# Patient Record
Sex: MALE | Race: BLACK OR AFRICAN AMERICAN
[De-identification: names, ages, dates, MRNs, and addresses within clinical notes are randomized per-mention and may not be internally consistent; named-entity substitution may affect disease eponyms.]

---

## 2018-02-11 ENCOUNTER — HOSPITAL ENCOUNTER (EMERGENCY)
Dept: HOSPITAL 17 - NEPE | Age: 48
Discharge: HOME | End: 2018-02-11
Payer: SELF-PAY

## 2018-02-11 VITALS
DIASTOLIC BLOOD PRESSURE: 91 MMHG | SYSTOLIC BLOOD PRESSURE: 145 MMHG | RESPIRATION RATE: 16 BRPM | HEART RATE: 69 BPM | OXYGEN SATURATION: 98 % | TEMPERATURE: 98.7 F

## 2018-02-11 VITALS — HEIGHT: 67 IN | BODY MASS INDEX: 23.53 KG/M2 | WEIGHT: 149.91 LBS

## 2018-02-11 DIAGNOSIS — G43.909: Primary | ICD-10-CM

## 2018-02-11 LAB
BASOPHILS # BLD AUTO: 0 TH/MM3 (ref 0–0.2)
BASOPHILS NFR BLD: 0.9 % (ref 0–2)
BUN SERPL-MCNC: 11 MG/DL (ref 7–18)
CALCIUM SERPL-MCNC: 8.1 MG/DL (ref 8.5–10.1)
CHLORIDE SERPL-SCNC: 108 MEQ/L (ref 98–107)
CREAT SERPL-MCNC: 1.13 MG/DL (ref 0.6–1.3)
EOSINOPHIL # BLD: 0.2 TH/MM3 (ref 0–0.4)
EOSINOPHIL NFR BLD: 5.1 % (ref 0–4)
ERYTHROCYTE [DISTWIDTH] IN BLOOD BY AUTOMATED COUNT: 13.1 % (ref 11.6–17.2)
GFR SERPLBLD BASED ON 1.73 SQ M-ARVRAT: 84 ML/MIN (ref 89–?)
GLUCOSE SERPL-MCNC: 53 MG/DL (ref 74–106)
HCO3 BLD-SCNC: 30.7 MEQ/L (ref 21–32)
HCT VFR BLD CALC: 38.2 % (ref 39–51)
HGB BLD-MCNC: 13.2 GM/DL (ref 13–17)
LYMPHOCYTES # BLD AUTO: 1.2 TH/MM3 (ref 1–4.8)
LYMPHOCYTES NFR BLD AUTO: 34.2 % (ref 9–44)
MCH RBC QN AUTO: 32.8 PG (ref 27–34)
MCHC RBC AUTO-ENTMCNC: 34.4 % (ref 32–36)
MCV RBC AUTO: 95.4 FL (ref 80–100)
MONOCYTE #: 0.3 TH/MM3 (ref 0–0.9)
MONOCYTES NFR BLD: 9.9 % (ref 0–8)
NEUTROPHILS # BLD AUTO: 1.7 TH/MM3 (ref 1.8–7.7)
NEUTROPHILS NFR BLD AUTO: 49.9 % (ref 16–70)
PLATELET # BLD: 167 TH/MM3 (ref 150–450)
PMV BLD AUTO: 8.6 FL (ref 7–11)
RBC # BLD AUTO: 4.01 MIL/MM3 (ref 4.5–5.9)
SODIUM SERPL-SCNC: 143 MEQ/L (ref 136–145)
WBC # BLD AUTO: 3.5 TH/MM3 (ref 4–11)

## 2018-02-11 PROCEDURE — 96361 HYDRATE IV INFUSION ADD-ON: CPT

## 2018-02-11 PROCEDURE — 85025 COMPLETE CBC W/AUTO DIFF WBC: CPT

## 2018-02-11 PROCEDURE — 99284 EMERGENCY DEPT VISIT MOD MDM: CPT

## 2018-02-11 PROCEDURE — 80048 BASIC METABOLIC PNL TOTAL CA: CPT

## 2018-02-11 PROCEDURE — 96374 THER/PROPH/DIAG INJ IV PUSH: CPT

## 2018-02-11 NOTE — PD
HPI


Chief Complaint:  Headache


Time Seen by Provider:  06:08


Travel History


International Travel<30 days:  No


Contact w/Intl Traveler<30days:  No


Traveled to known affect area:  No





History of Present Illness


HPI


47-year-old male came to the emergency room with history of headache.  Patient 

has history of migraine.  Patient says that he hasn't had headache in past 6-7 

months.  The location and the nature of the headache is like his usual except 

this is worse in intensity.  Patient says this is been going on for past 2 

days.  He's been taking Excedrin Migraine without much relief.  No history of 

vomiting.  He has been nauseous.  He has some photophobia.  No history of fever 

or chills.  No history of neck pain.  Vital signs are stable otherwise.





[facility]


Past Medical History


*** Narrative Medical


List of his past medical, surgical, social and family history is reviewed from 

the nursing note.





Past Surgical History


Other Surgery:  Yes (hand surgery)





Social History


Alcohol Use:  Yes (occasional )


Tobacco Use:  No


Substance Use:  No





Allergies-Medications


(Allergen,Severity, Reaction):  


Coded Allergies:  


     No Known Allergies (Unverified , 7/27/16)


Comments


No known drug allergies.


Reported Meds & Prescriptions





Reported Meds & Active Scripts


Active


Keflex (Cephalexin Monohydrate) 500 Mg Cap 500 Mg PO Q8


Ultram (Tramadol HCl) 50 Mg Tab 1-2 Tab PO Q6H PRN


     FOR PAIN


Phenergan (Promethazine HCl) 25 Mg Tab 25 Mg PO Q6H PRN


     FOR NAUSEA/VOMITING





Narrative Medication


List of his home medications reviewed from the nursing note.





Review of Systems


Except as stated in HPI:  all other systems reviewed are Neg


Neurologic:  Positive: Headache





Physical Exam


Narrative


GENERAL: Awake, alert, moderate distress


SKIN: Focused skin assessment warm/dry.


HEAD: Atraumatic. Normocephalic. 


EYES: Pupils equal and round. No scleral icterus. No injection or drainage. 


ENT: No nasal bleeding or discharge.  Mucous membranes pink and moist.


NECK: Trachea midline. No JVD.  Neck is supple.  No signs of meningismus


CARDIOVASCULAR: Regular rate and rhythm.  No murmur appreciated.


RESPIRATORY: No accessory muscle use. Clear to auscultation. Breath sounds 

equal bilaterally. 


GASTROINTESTINAL: Abdomen soft, non-tender, nondistended. Hepatic and splenic 

margins not palpable. 


MUSCULOSKELETAL: No obvious deformities. No clubbing.  No cyanosis.  No edema. 


NEUROLOGICAL: Awake and alert. No obvious cranial nerve deficits.  Motor 

grossly within normal limits. Normal speech.


PSYCHIATRIC: Appropriate mood and affect; insight and judgment normal.





Data


Data


Last Documented VS





Vital Signs








  Date Time  Temp Pulse Resp B/P (MAP) Pulse Ox O2 Delivery O2 Flow Rate FiO2


 


2/11/18 05:53 98.7 69 16 145/91 (109) 98   








Orders





 Orders


Complete Blood Count With Diff (2/11/18 06:16)


Basic Metabolic Panel (Bmp) (2/11/18 06:16)


Ecg Monitoring (2/11/18 06:16)


Iv Access Insert/Monitor (2/11/18 06:16)


Oximetry (2/11/18 06:16)


Sodium Chloride 0.9% Flush (Ns Flush) (2/11/18 06:30)


Prochlorperazine Inj (Compazine Inj) (2/11/18 06:30)


Sodium Chlor 0.9% 1000 Ml Inj (Ns 1000 M (2/11/18 06:16)


Ed Discharge Order (2/11/18 08:20)





Labs





Laboratory Tests








Test


  2/11/18


06:35


 


White Blood Count 3.5 TH/MM3 


 


Red Blood Count 4.01 MIL/MM3 


 


Hemoglobin 13.2 GM/DL 


 


Hematocrit 38.2 % 


 


Mean Corpuscular Volume 95.4 FL 


 


Mean Corpuscular Hemoglobin 32.8 PG 


 


Mean Corpuscular Hemoglobin


Concent 34.4 % 


 


 


Red Cell Distribution Width 13.1 % 


 


Platelet Count 167 TH/MM3 


 


Mean Platelet Volume 8.6 FL 


 


Neutrophils (%) (Auto) 49.9 % 


 


Lymphocytes (%) (Auto) 34.2 % 


 


Monocytes (%) (Auto) 9.9 % 


 


Eosinophils (%) (Auto) 5.1 % 


 


Basophils (%) (Auto) 0.9 % 


 


Neutrophils # (Auto) 1.7 TH/MM3 


 


Lymphocytes # (Auto) 1.2 TH/MM3 


 


Monocytes # (Auto) 0.3 TH/MM3 


 


Eosinophils # (Auto) 0.2 TH/MM3 


 


Basophils # (Auto) 0.0 TH/MM3 


 


CBC Comment DIFF FINAL 


 


Differential Comment  


 


Blood Urea Nitrogen 11 MG/DL 


 


Creatinine 1.13 MG/DL 


 


Random Glucose 53 MG/DL 


 


Calcium Level 8.1 MG/DL 


 


Sodium Level 143 MEQ/L 


 


Potassium Level 3.7 MEQ/L 


 


Chloride Level 108 MEQ/L 


 


Carbon Dioxide Level 30.7 MEQ/L 


 


Anion Gap 4 MEQ/L 


 


Estimat Glomerular Filtration


Rate 84 ML/MIN 


 











MDM


Medical Decision Making


Medical Screen Exam Complete:  Yes


Emergency Medical Condition:  Yes


Medical Record Reviewed:  Yes


Differential Diagnosis


Migraine headache, headache NOS


Narrative Course


7:09 AM patient has been given IV fluid bolus and IV Compazine.  Awaiting for 

blood test results.  Patient has been signed over to the oncoming ER physician.





Procedures


EKG Prior to Arrival:  Jadon Cohen MD Feb 11, 2018 06:08

## 2018-02-11 NOTE — PD
Data


Data


Last Documented VS





Vital Signs








  Date Time  Temp Pulse Resp B/P (MAP) Pulse Ox O2 Delivery O2 Flow Rate FiO2


 


2/11/18 05:53 98.7 69 16 145/91 (109) 98   








Orders





 Orders


Complete Blood Count With Diff (2/11/18 06:16)


Basic Metabolic Panel (Bmp) (2/11/18 06:16)


Ecg Monitoring (2/11/18 06:16)


Iv Access Insert/Monitor (2/11/18 06:16)


Oximetry (2/11/18 06:16)


Sodium Chloride 0.9% Flush (Ns Flush) (2/11/18 06:30)


Prochlorperazine Inj (Compazine Inj) (2/11/18 06:30)


Sodium Chlor 0.9% 1000 Ml Inj (Ns 1000 M (2/11/18 06:16)





Labs





Laboratory Tests








Test


  2/11/18


06:35


 


White Blood Count 3.5 TH/MM3 


 


Red Blood Count 4.01 MIL/MM3 


 


Hemoglobin 13.2 GM/DL 


 


Hematocrit 38.2 % 


 


Mean Corpuscular Volume 95.4 FL 


 


Mean Corpuscular Hemoglobin 32.8 PG 


 


Mean Corpuscular Hemoglobin


Concent 34.4 % 


 


 


Red Cell Distribution Width 13.1 % 


 


Platelet Count 167 TH/MM3 


 


Mean Platelet Volume 8.6 FL 


 


Neutrophils (%) (Auto) 49.9 % 


 


Lymphocytes (%) (Auto) 34.2 % 


 


Monocytes (%) (Auto) 9.9 % 


 


Eosinophils (%) (Auto) 5.1 % 


 


Basophils (%) (Auto) 0.9 % 


 


Neutrophils # (Auto) 1.7 TH/MM3 


 


Lymphocytes # (Auto) 1.2 TH/MM3 


 


Monocytes # (Auto) 0.3 TH/MM3 


 


Eosinophils # (Auto) 0.2 TH/MM3 


 


Basophils # (Auto) 0.0 TH/MM3 


 


CBC Comment DIFF FINAL 


 


Differential Comment  


 


Blood Urea Nitrogen 11 MG/DL 


 


Creatinine 1.13 MG/DL 


 


Random Glucose 53 MG/DL 


 


Calcium Level 8.1 MG/DL 


 


Sodium Level 143 MEQ/L 


 


Potassium Level 3.7 MEQ/L 


 


Chloride Level 108 MEQ/L 


 


Carbon Dioxide Level 30.7 MEQ/L 


 


Anion Gap 4 MEQ/L 


 


Estimat Glomerular Filtration


Rate 84 ML/MIN 


 











[facility]


Supervised Visit with PATRICK:  Yes


Narrative Course


47-year-old man, history of migraines, here with migraine, now improved 

following migraine treatment.





CBC is unremarkable.


BMP is unremarkable.


Diagnosis





 Primary Impression:  


 Migraine


Patient Instructions:  General Instructions





***Additional Instruction:  


Follow-up with her primary doctor in the next 2-4 days.





Return to the emergency department for any new or worsening symptoms.


***Med/Other Pt SpecificInfo:  No Change to Meds


Disposition:  01 DISCHARGE HOME


Condition:  Stable











Good Hedrick MD Feb 11, 2018 08:20

## 2018-05-08 ENCOUNTER — HOSPITAL ENCOUNTER (EMERGENCY)
Dept: HOSPITAL 17 - NEPD | Age: 48
LOS: 1 days | Discharge: HOME | End: 2018-05-09
Payer: SELF-PAY

## 2018-05-08 VITALS
HEART RATE: 84 BPM | DIASTOLIC BLOOD PRESSURE: 75 MMHG | TEMPERATURE: 98.6 F | SYSTOLIC BLOOD PRESSURE: 123 MMHG | OXYGEN SATURATION: 100 % | RESPIRATION RATE: 18 BRPM

## 2018-05-08 VITALS — BODY MASS INDEX: 22.49 KG/M2 | WEIGHT: 143.3 LBS | HEIGHT: 67 IN
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
## 2018-05-08 DIAGNOSIS — L08.9: ICD-10-CM

## 2018-05-08 DIAGNOSIS — S80.861A: Primary | ICD-10-CM

## 2018-05-08 PROCEDURE — 99283 EMERGENCY DEPT VISIT LOW MDM: CPT

## 2018-05-09 NOTE — PD
HPI


Chief Complaint:  Skin Problem


Time Seen by Provider:  23:48


Travel History


International Travel<30 days:  No


Contact w/Intl Traveler<30days:  No


Traveled to known affect area:  No





History of Present Illness


HPI


47-year-old black male presents emergency department for evaluation of a 

possible spider bite to his right lower anterior leg which occurred over the 

last day or 2.  He states that he had small pimple develop which ruptured and 

is now become increasingly painful and swollen.  No discharge.  He denies any 

fever chills.  Pain is mild.  No alleviating or exacerbating factors.  Up-to-

date with immunizations.





PFSH


Past Medical History


Headaches:  Yes


Medical other:  Yes (hypoglycemia)


Migraines:  Yes


Tetanus Vaccination:  < 5 Years


Influenza Vaccination:  No





Past Surgical History


Other Surgery:  Yes (right hand surgery)





Social History


Alcohol Use:  Yes (occasional )


Tobacco Use:  Yes


Substance Use:  No





Allergies-Medications


(Allergen,Severity, Reaction):  


Coded Allergies:  


     No Known Allergies (Unverified  Adverse Reaction, Unknown, 5/8/18)


Reported Meds & Prescriptions





Reported Meds & Active Scripts


Active


Bactrim DS (Sulfamethoxazole-Trimethoprim) 800-160 Mg Tab 1 Tab PO BID


Keflex (Cephalexin Monohydrate) 500 Mg Cap 500 Mg PO Q8


Ultram (Tramadol HCl) 50 Mg Tab 1-2 Tab PO Q6H PRN


     FOR PAIN


Promethazine Hcl (Promethazine HCl) 25 Mg Tab 25 Mg PO Q6H PRN


     FOR NAUSEA/VOMITING








Review of Systems


General / Constitutional:  No: Fever


Eyes:  No: Visual changes


HENT:  No: Headaches


Cardiovascular:  No: Chest Pain or Discomfort


Respiratory:  No: Shortness of Breath


Gastrointestinal:  No: Abdominal Pain


Genitourinary:  No: Dysuria


Musculoskeletal:  No: Pain


Skin:  Positive Rash, Positive Lesions


Neurologic:  No: Weakness


Psychiatric:  No: Depression


Endocrine:  No: Polydipsia


Hematologic/Lymphatic:  No: Easy Bruising





Physical Exam


Narrative


GENERAL: This is a well-nourished, well-developed patient, in no apparent 

distress.


SKIN: Patient has a small area of erythema to the right anterior pretibial area 

with a unroofed papular lesion.  No discharge.  No fluctuance or pointing.  No 

ecchymosis.  No lymphangitis.


HEAD: Atraumatic. Normocephalic.


EYES: PERRL, EOMI, no discharge or injection. No scleral icterus.


EARS: Clear


NOSE: Nasal turbinates appear normal.


THROAT: Mucosa pink and moist.  Airway patent.


NECK: Trachea midline. supple, moves head freely.


LUNGS: Clear to auscultation.


CV: Regular in rhythm.


ABDOMEN: Soft nontender.


EXT: No clubbing cyanosis or edema.





Data


Data


Last Documented VS





Vital Signs








  Date Time  Temp Pulse Resp B/P (MAP) Pulse Ox O2 Delivery O2 Flow Rate FiO2


 


5/8/18 22:25 98.6 84 18 123/75 (91) 100   








Orders





 Orders


Ed Discharge Order (5/8/18 23:58)


Sulfamet-Trimeth Ds 800-160 Mg (Bactrim (5/9/18 00:00)








MDM


Medical Decision Making


Medical Screen Exam Complete:  Yes


Emergency Medical Condition:  Yes


Medical Record Reviewed:  Yes


Differential Diagnosis


MDM: High


Differential diagnoses: Abscess, folliculitis, cellulitis, lymphangitis, 

abrasion, contact dermatitis


Narrative Course


Patient appears to have had possible insect bite with local reaction which is 

now mildly infected.  Patient is given Bactrim DS p.o. here in the ER.  He is 

discharged on a prescription.





This is insect bite, infected





Diagnosis





 Primary Impression:  


 Insect bite, infected


 Qualified Codes:  W57.XXXA - Bitten or stung by nonvenomous insect and other 

nonvenomous arthropods, initial encounter


Patient Instructions:  General Instructions





***Additional Instructions:  


Rest.


Elevation.


keep clean and dry.


Warm compresses..


Daily wound care with soap, water and Neosporin.


Three Advil every 6 hours.


Bactrim DS.


Follow-up with a primary care doctor in one week.


Return to the ER for any problems.


***Med/Other Pt SpecificInfo:  Prescription(s) given, Wound Care


Scripts


Sulfamethoxazole-Trimethoprim (Bactrim DS) 800-160 Mg Tab


1 TAB PO BID for Infection, #14 TAB 0 Refills


   Prov: Jadon Riggs MD         5/8/18


Disposition:  01 DISCHARGE HOME


Condition:  Stable











Jaime Givens May 9, 2018 00:06